# Patient Record
Sex: FEMALE | Race: WHITE | NOT HISPANIC OR LATINO | URBAN - NONMETROPOLITAN AREA
[De-identification: names, ages, dates, MRNs, and addresses within clinical notes are randomized per-mention and may not be internally consistent; named-entity substitution may affect disease eponyms.]

---

## 2019-01-21 NOTE — PATIENT DISCUSSION
Pt father Franklin Ordonez was calling for pt test results. Informed him of  message below and he verbalized understanding. Father then wanted to know if  can order a Celiac test. Father stated that it runs on mother side of the family.  He noticed t Offered Retina consult secondary to suspicious OCT OS.

## 2019-02-04 ENCOUNTER — IMPORTED ENCOUNTER (OUTPATIENT)
Dept: URBAN - NONMETROPOLITAN AREA CLINIC 1 | Facility: CLINIC | Age: 69
End: 2019-02-04

## 2019-02-04 PROBLEM — H25.13: Noted: 2019-02-04

## 2019-02-04 PROBLEM — H52.13: Noted: 2019-02-04

## 2019-02-04 PROCEDURE — 92014 COMPRE OPH EXAM EST PT 1/>: CPT

## 2019-02-04 PROCEDURE — 92015 DETERMINE REFRACTIVE STATE: CPT

## 2019-02-04 NOTE — PATIENT DISCUSSION
Myopia OUDiscussed refractive status in detail with patient. New glasses Rx given today. Continue to monitor. Kaylan OUDiscussed diagnosis with patient. Reviewed symptoms related to cataract progression. Discussed various treatment options with patient. Recommend cataract evaluation by Dr. Maggie Poe. Patient defers treatment at this time. Continue to monitor.

## 2020-02-05 ENCOUNTER — IMPORTED ENCOUNTER (OUTPATIENT)
Dept: URBAN - NONMETROPOLITAN AREA CLINIC 1 | Facility: CLINIC | Age: 70
End: 2020-02-05

## 2020-02-05 PROBLEM — H25.13: Noted: 2020-02-05

## 2020-02-05 PROCEDURE — 92014 COMPRE OPH EXAM EST PT 1/>: CPT

## 2020-02-05 NOTE — PATIENT DISCUSSION
Cataracts-Visually significant cataract OU .-Cataract(s) causing symptomatic impairment of visual function not correctable with a tolerable change in glasses or contact lenses lighting or non-operative means resulting in specific activity limitations and/or participation restrictions including but not limited to reading viewing television driving or meeting vocational or recreational needs. -Expectation is clearer vision and functional improvement in symptoms as well as reduced glare disability after cataract removal.-Order IOLMaster and OPD today. -Recommend LRI OD and Toric OS/Trad OU based on today's OPD testing and lifestyle questionnaire.-All questions were answered regarding surgery including pre and post-op medications appointments activity restrictions and anesthetic usage.-The risks benefits and alternatives and special risk factors for the patient were discussed in detail including but not limited to: bleeding infection retinal detachment vitreous loss problems with the implant and possible need for additional surgery.-Although rare the possibility of complete vision loss was discussed.-The possible need for glasses post-operatively was discussed.-Order medical clearance exam based on history of HBP-Patient elects to proceed with cataract surgery OD . Will schedule at patient's convenience and re-evaluate OS  in the future. Discussed all lens options in detail with patient. Discussed LensX vs Trad with patient. Discussed astigmatism in detail with paient. Astigmatism OS > OD. Discussed LRI for OD in detail and Toric OS due the amount of astigmatism. Discussedkeeping her vision like is now (nearsighted-she sees better to read without glasses now and likes this)  but informed patient this would reduce her distance vision and she would need glasses for her distance. Informed patient this will not correct her astigmatism. Discussed Panoptix do not recc this lens for patient d/t her nearsightedness that she is accustomed to now. Trad sx OU will work with either IOL patient chooses. After discussion we janel leave patient near sighted and no correction for astigmatism. Standard IOL OU/Trad *Drusen noted on dilated exam today.  Verbally ordered OCT MAC*

## 2020-02-25 ENCOUNTER — IMPORTED ENCOUNTER (OUTPATIENT)
Dept: URBAN - NONMETROPOLITAN AREA CLINIC 1 | Facility: CLINIC | Age: 70
End: 2020-02-25

## 2020-02-25 PROBLEM — Z01.818: Noted: 2020-02-25

## 2020-02-25 PROBLEM — F41.1: Noted: 2020-02-25

## 2020-02-25 PROBLEM — I10: Noted: 2020-02-25

## 2020-03-11 ENCOUNTER — IMPORTED ENCOUNTER (OUTPATIENT)
Dept: URBAN - NONMETROPOLITAN AREA CLINIC 1 | Facility: CLINIC | Age: 70
End: 2020-03-11

## 2020-03-11 PROBLEM — H26.491: Noted: 2020-03-11

## 2020-03-11 PROBLEM — H25.12: Noted: 2020-03-11

## 2020-03-11 PROBLEM — Z98.41: Noted: 2020-03-11

## 2020-03-11 PROCEDURE — 99024 POSTOP FOLLOW-UP VISIT: CPT

## 2020-03-11 NOTE — PATIENT DISCUSSION
1 Day POV CE OD 3/10/20 Standard- Discussed diagnosis in detail with patient- Patient is stable and doing well- Wound intact- PCO OD noted but stable and no treatment needed at this time - Continue post op drop as directed- Continue to monitor- RTC 1 week POV A/S with Dr. Sonja Napoles OS-Visually significant.-Cataract  causing symptomatic impairment of visual function not correctable with a tolerable change in glasses or contact lenses lighting or non-operative means resulting in specific activity limitations and/or participation restrictions including but not limited to reading viewing television driving or meeting vocational or recreational needs. -Expectation is clearer vision and reduced glare disability after cataract removal.-Refer to Dr James Weiss for cataract evaluation

## 2020-03-17 ENCOUNTER — IMPORTED ENCOUNTER (OUTPATIENT)
Dept: URBAN - NONMETROPOLITAN AREA CLINIC 1 | Facility: CLINIC | Age: 70
End: 2020-03-17

## 2020-03-17 PROBLEM — Z98.41: Noted: 2020-03-17

## 2020-03-17 PROBLEM — H26.491: Noted: 2020-03-11

## 2020-03-17 PROBLEM — H25.12: Noted: 2020-03-17

## 2020-03-17 PROCEDURE — 99024 POSTOP FOLLOW-UP VISIT: CPT

## 2020-03-17 NOTE — PATIENT DISCUSSION
Cataract-Visually significant cataract OS . -Cataractcausing symptomatic impairment of visual function not correctable with a tolerable change in glasses or contact lenses lighting or non-operative means resulting in specific activity limitations and/or participation restrictions including but not limited to reading viewing television driving or meeting vocational or recreational needs. -Expectation is clearer vision and functional improvement in symptoms as well as reduced glare disability after cataract removal.-Recommend STandard  based on previous OPD testing and lifestyle questionnaire.-All questions were answered regarding surgery including pre and post-op medications appointments activity restrictions and anesthetic usage.-The risks benefits and alternatives and special risk factors for the patient were discussed in detail including but not limited to: bleeding infection retinal detachment vitreous loss problems with the implant and possible need for additional surgery.-Although rare the possibility of complete vision loss was discussed.-The need for glasses post-operatively was discussed.-Patient elects to proceed with cataract surgery OS . Will schedule at patient's convenience. s/p PCIOL CE OD -Pt doing well at 1 week s/p PCIOL. -Continue post-op gtts according to instruction sheet.-Okay to resume usual activites and d/c eye shield. -Patient c/o vision today discussed with aptient she wanted to stay near sighted and be able to read without her glasses and that is how I corrected her VA and she understands-MR done today and patient can get to 20/22 and discussed this with patient in detail. She will get new glasses after CE OS and will see good at distance and still be able to read without glasses like she requested.

## 2020-07-28 ENCOUNTER — IMPORTED ENCOUNTER (OUTPATIENT)
Dept: URBAN - NONMETROPOLITAN AREA CLINIC 1 | Facility: CLINIC | Age: 70
End: 2020-07-28

## 2020-07-28 PROBLEM — Z98.41: Noted: 2020-07-28

## 2020-07-28 PROBLEM — H40.013: Noted: 2020-07-28

## 2020-07-28 PROBLEM — H25.12: Noted: 2020-07-28

## 2020-07-28 PROBLEM — H26.491: Noted: 2020-07-28

## 2020-07-28 NOTE — PATIENT DISCUSSION
Refraction Only- Discussed diagnois in detail with patient - MR done today by Dr. Pierre Novoa and new glasses RX given - Will talk to Dr. Fernando Canela about cataract eval if we should proceed with re-eval - Continue to monitor Borderline Glaucoma OU- Discussed diagnosis in detail with patient- IOP today OD 16 and OS 17- Cup to Disc noted at OD . 6 and OS . 54- OCT ONH done today ordered by Dr. Joe Josephose shows Inferior NFL thinning and OS shows No NFL thinning - Continue to monitor- RTC this week F/U with VF Pseudophakia OU with PCO OD- Discussed diagnosis in detail with patient- PCO noted today but stable and no treatment needed at this time - Continue to monitor------------------------------previous notes-----------------------------Cataract-Visually significant cataract OS . -Cataractcausing symptomatic impairment of visual function not correctable with a tolerable change in glasses or contact lenses lighting or non-operative means resulting in specific activity limitations and/or participation restrictions including but not limited to reading viewing television driving or meeting vocational or recreational needs. -Expectation is clearer vision and functional improvement in symptoms as well as reduced glare disability after cataract removal.-Recommend STandard  based on previous OPD testing and lifestyle questionnaire.-All questions were answered regarding surgery including pre and post-op medications appointments activity restrictions and anesthetic usage.-The risks benefits and alternatives and special risk factors for the patient were discussed in detail including but not limited to: bleeding infection retinal detachment vitreous loss problems with the implant and possible need for additional surgery.-Although rare the possibility of complete vision loss was discussed.-The need for glasses post-operatively was discussed.-Patient elects to proceed with cataract surgery OS . Will schedule at patient's convenience. s/p PCIOL CE OD -Pt doing well at 1 week s/p PCIOL. -Continue post-op gtts according to instruction sheet.-Okay to resume usual activites and d/c eye shield. -Patient c/o vision today discussed with aptient she wanted to stay near sighted and be able to read without her glasses and that is how I corrected her VA and she understands-MR done today and patient can get to 20/22 and discussed this with patient in detail.  She will get new glasses after CE OS and will see good at distance and still be able to read without glasses like she requested.; 's Notes: PARTH Soto 74 7/28/20

## 2020-07-30 ENCOUNTER — IMPORTED ENCOUNTER (OUTPATIENT)
Dept: URBAN - NONMETROPOLITAN AREA CLINIC 1 | Facility: CLINIC | Age: 70
End: 2020-07-30

## 2020-07-30 PROBLEM — Z98.41: Noted: 2020-07-28

## 2020-07-30 PROBLEM — H26.491: Noted: 2020-07-28

## 2020-07-30 PROBLEM — H25.12: Noted: 2020-07-28

## 2020-07-30 PROBLEM — H40.1232: Noted: 2020-07-30

## 2020-07-30 PROCEDURE — 92083 EXTENDED VISUAL FIELD XM: CPT

## 2020-07-30 PROCEDURE — 92020 GONIOSCOPY: CPT

## 2020-07-30 PROCEDURE — 76514 ECHO EXAM OF EYE THICKNESS: CPT

## 2020-07-30 PROCEDURE — 99213 OFFICE O/P EST LOW 20 MIN: CPT

## 2020-07-30 NOTE — PATIENT DISCUSSION
LTG  (Moderate) OU - Discussed diagnosis in detail with patient- No family history of Glaucoma patient does have mild sleep apnea- IOP today 16 OU- Cup to Disc noted at OD . 6 and OS . 54- OCT done previously ordered by Dr. Amrit Brown shows Inferior NFL thinning and OS shows No NFL thinning - PACHY done today  and - VF done today OD shows [place] with Superior Nasal Step and OS shows [address] field with Borderline Enlarged spot - Start [facility] OU sample given today - Continue to monitor- RTC 2 weeks pressure check and cataract re-eval with Dr. Ameena Ritchie in detail with patient l - Continue to monitor Pseudophakia OU with PCO OD- Discussed diagnosis in detail with patient- PCO noted today but stable and no treatment needed at this time - Continue to monitor------------------------------previous notes-----------------------------Cataract-Visually significant cataract OS . -Cataractcausing symptomatic impairment of visual function not correctable with a tolerable change in glasses or contact lenses lighting or non-operative means resulting in specific activity limitations and/or participation restrictions including but not limited to reading viewing television driving or meeting vocational or recreational needs. -Expectation is clearer vision and functional improvement in symptoms as well as reduced glare disability after cataract removal.-Recommend STandard  based on previous OPD testing and lifestyle questionnaire.-All questions were answered regarding surgery including pre and post-op medications appointments activity restrictions and anesthetic usage.-The risks benefits and alternatives and special risk factors for the patient were discussed in detail including but not limited to: bleeding infection retinal detachment vitreous loss problems with the implant and possible need for additional surgery.-Although rare the possibility of complete vision loss was discussed.-The need for glasses post-operatively was discussed.-Patient elects to proceed with cataract surgery OS . Will schedule at patient's convenience. s/p PCIOL CE OD -Pt doing well at 1 week s/p PCIOL. -Continue post-op gtts according to instruction sheet.-Okay to resume usual activites and d/c eye shield. -Patient c/o vision today discussed with aptient she wanted to stay near sighted and be able to read without her glasses and that is how I corrected her VA and she understands-MR done today and patient can get to 20/22 and discussed this with patient in detail.  She will get new glasses after CE OS and will see good at distance and still be able to read without glasses like she requested.; 's Notes: OCT [facility] [place] 7/28/20

## 2020-08-06 ENCOUNTER — IMPORTED ENCOUNTER (OUTPATIENT)
Dept: URBAN - NONMETROPOLITAN AREA CLINIC 1 | Facility: CLINIC | Age: 70
End: 2020-08-06

## 2020-08-06 PROCEDURE — 99213 OFFICE O/P EST LOW 20 MIN: CPT

## 2020-08-06 NOTE — PATIENT DISCUSSION
LTG  (Moderate) OU - Discussed diagnosis in detail with patient- No family history of Glaucoma patient does have mild sleep apnea- IOP today 12 OU- Cup to Disc noted at OD . 6 and OS . 54- OCT done previously ordered by Dr. Lana Milligan shows Inferior NFL thinning and OS shows No NFL thinning - PACHY done previously  and - VF done previously OD shows [place] with Superior Nasal Step and OS shows [address] field with Borderline Enlarged spot - D/C Lumigan QHS OU patient having redness and irritation - Start Betimolol QAM OU sample given today- Continue to monitor- RTC 2 weeks pressure check and cataract re-eval with Dr. Carl Lozada in detail with patient - Continue to monitor Pseudophakia OU with PCO OD- Discussed diagnosis in detail with patient- PCO noted today but stable and no treatment needed at this time - Continue to monitor------------------------------previous notes-----------------------------Cataract-Visually significant cataract OS . -Cataractcausing symptomatic impairment of visual function not correctable with a tolerable change in glasses or contact lenses lighting or non-operative means resulting in specific activity limitations and/or participation restrictions including but not limited to reading viewing television driving or meeting vocational or recreational needs. -Expectation is clearer vision and functional improvement in symptoms as well as reduced glare disability after cataract removal.-Recommend STandard  based on previous OPD testing and lifestyle questionnaire.-All questions were answered regarding surgery including pre and post-op medications appointments activity restrictions and anesthetic usage.-The risks benefits and alternatives and special risk factors for the patient were discussed in detail including but not limited to: bleeding infection retinal detachment vitreous loss problems with the implant and possible need for additional surgery.-Although rare the possibility of complete vision loss was discussed.-The need for glasses post-operatively was discussed.-Patient elects to proceed with cataract surgery OS . Will schedule at patient's convenience. s/p PCIOL CE OD -Pt doing well at 1 week s/p PCIOL. -Continue post-op gtts according to instruction sheet.-Okay to resume usual activites and d/c eye shield. -Patient c/o vision today discussed with misael she wanted to stay near sighted and be able to read without her glasses and that is how I corrected her VA and she understands-MR done today and patient can get to 20/22 and discussed this with patient in detail.  She will get new glasses after CE OS and will see good at distance and still be able to read without glasses like she requested.; Dr's Notes: PARTH Soto 74 7/28/20

## 2020-08-14 ENCOUNTER — IMPORTED ENCOUNTER (OUTPATIENT)
Dept: URBAN - NONMETROPOLITAN AREA CLINIC 1 | Facility: CLINIC | Age: 70
End: 2020-08-14

## 2020-08-14 PROCEDURE — 99213 OFFICE O/P EST LOW 20 MIN: CPT

## 2020-08-14 NOTE — PATIENT DISCUSSION
LTG  (Moderate) OU - Discussed diagnosis in detail with patient- No family history of Glaucoma patient does have mild sleep apnea- IOP today OD 13 and OS 14- Cup to Disc noted at OD . 6 and OS . 54- OCT done previously ordered by Dr. Kesha Rodriguez shows Inferior NFL thinning and OS shows No NFL thinning - PACHY done previously  and - VF done previously OD shows [place] with Superior Nasal Step and OS shows [address] field with Borderline Enlarged spot - D/C Lumigan QHS OU patient having redness and irritation - Continue Azopt BID OU- Sample of Lumify given today for redness- Continue to monitor- RTC A/S with Dr. Ld Su in detail with patient - Continue to monitor Pseudophakia OU with PCO OD- Discussed diagnosis in detail with patient- PCO noted today but stable and no treatment needed at this time - Continue to monitor------------------------------previous notes-----------------------------Cataract-Visually significant cataract OS . -Cataractcausing symptomatic impairment of visual function not correctable with a tolerable change in glasses or contact lenses lighting or non-operative means resulting in specific activity limitations and/or participation restrictions including but not limited to reading viewing television driving or meeting vocational or recreational needs. -Expectation is clearer vision and functional improvement in symptoms as well as reduced glare disability after cataract removal.-Recommend STandard  based on previous OPD testing and lifestyle questionnaire.-All questions were answered regarding surgery including pre and post-op medications appointments activity restrictions and anesthetic usage.-The risks benefits and alternatives and special risk factors for the patient were discussed in detail including but not limited to: bleeding infection retinal detachment vitreous loss problems with the implant and possible need for additional surgery.-Although rare the possibility of complete vision loss was discussed.-The need for glasses post-operatively was discussed.-Patient elects to proceed with cataract surgery OS . Will schedule at patient's convenience. s/p PCIOL CE OD -Pt doing well at 1 week s/p PCIOL. -Continue post-op gtts according to instruction sheet.-Okay to resume usual activites and d/c eye shield. -Patient c/o vision today discussed with aptient she wanted to stay near sighted and be able to read without her glasses and that is how I corrected her VA and she understands-MR done today and patient can get to 20/22 and discussed this with patient in detail.  She will get new glasses after CE OS and will see good at distance and still be able to read without glasses like she requested.; 's Notes: PARTH Soto 74 7/28/20

## 2020-09-16 ENCOUNTER — IMPORTED ENCOUNTER (OUTPATIENT)
Dept: URBAN - NONMETROPOLITAN AREA CLINIC 1 | Facility: CLINIC | Age: 70
End: 2020-09-16

## 2020-09-16 PROCEDURE — 99214 OFFICE O/P EST MOD 30 MIN: CPT

## 2020-09-16 NOTE — PATIENT DISCUSSION
Low Tension Glaucoma - Discussed diagnosis in detail w/  patient - Discussed signs and symptoms associated - IOP 13 OU today - Cup to disc: 0.6 OD and 0.55 OS - Stop Azopt - Start Latanoprost QHS OU Rx sent to pharmacy [facility] -2.59 OS Cataract(s)-Visually significant cataract OS . -Cataract(s) causing symptomatic impairment of visual function not correctable with a tolerable change in glasses or contact lenses lighting or non-operative means resulting in specific activity limitations and/or participation restrictions including but not limited to reading viewing television driving or meeting vocational or recreational needs. -Expectation is clearer vision and functional improvement in symptoms as well as reduced glare disability after cataract removal.-Order IOLMaster and OPD today. -Recommend standard/trad based on today's OPD testing and lifestyle questionnaire.-All questions were answered regarding surgery including pre and post-op medications appointments activity restrictions and anesthetic usage.-The risks benefits and alternatives and special risk factors for the patient were discussed in detail including but not limited to: bleeding infection retinal detachment vitreous loss problems with the implant and possible need for additional surgery.-Although rare the possibility of complete vision loss was discussed.-The possible need for glasses post-operatively was discussed.-Order medical clearance exam based on history of age-Patient elects to proceed with cataract surgery OS .  Will schedule at patient's convenience s/p Pseudophakia OD w/ PCO - intraocular lens is stable and in place - no treatment indication for PCO at this time; discussed signs and symptomss associated w/ progression - continue to monitor for changes; 's Notes: OCT Dean Soto 74 7/28/20

## 2020-09-21 ENCOUNTER — IMPORTED ENCOUNTER (OUTPATIENT)
Dept: URBAN - NONMETROPOLITAN AREA CLINIC 1 | Facility: CLINIC | Age: 70
End: 2020-09-21

## 2020-09-21 PROBLEM — I10: Noted: 2020-09-21

## 2020-09-21 PROBLEM — F41.1: Noted: 2020-09-21

## 2020-09-21 PROBLEM — Z01.818: Noted: 2020-09-21

## 2020-09-21 NOTE — PATIENT DISCUSSION
Medical Clearance-Medical clearance done today. -No outstanding concerns that would preclude surgery.-Patient is cleared to proceed with scheduled surgery.; 's Notes: PARTH Soto 74 7/28/20

## 2020-09-30 ENCOUNTER — IMPORTED ENCOUNTER (OUTPATIENT)
Dept: URBAN - NONMETROPOLITAN AREA CLINIC 1 | Facility: CLINIC | Age: 70
End: 2020-09-30

## 2020-09-30 PROBLEM — H40.013: Noted: 2020-09-30

## 2020-09-30 PROBLEM — Z96.1: Noted: 2020-09-30

## 2020-09-30 PROBLEM — H43.811: Noted: 2020-09-30

## 2020-09-30 PROBLEM — H26.491: Noted: 2020-09-30

## 2020-09-30 PROCEDURE — 99213 OFFICE O/P EST LOW 20 MIN: CPT

## 2020-09-30 PROCEDURE — 92250 FUNDUS PHOTOGRAPHY W/I&R: CPT

## 2020-09-30 NOTE — PATIENT DISCUSSION
PVD OD - Discussed findings of exam in detail with the patient. - The risk of retinal detachment in patients with PVDs was discussed with the patient and the warning signs of retinal detachment were carefully reviewed with the patient. - The patient was warned to return to the office or contact the ophthalmologist on call immediately if they experience signs of retinal detachment. - Optos done today OD shows floaters and OS stable but no signs of retinal holes tears or detachments. - Continue to monitor Pseudophakia OU with PCO OD- Discussed diagnosis in detail with patient- PCO noted today but stable and no treatment needed at this time - Explaiend to patient that their is scar tissue noted today in the right eye but she is not ready for treatment at this time - Continue to monitorLTG  (Moderate) OU - Discussed diagnosis in detail with patient- No family history of Glaucoma patient does have mild sleep apnea- IOP today 16 OU pressures are increased from last visit - Cup to Disc noted at OD . 6 and OS . 54- OCT done previously ordered by Dr. Dustin Britton shows Inferior NFL thinning and OS shows No NFL thinning - PACHY done previously  and - VF done previously OD shows [place] with Superior Nasal Step and OS shows [place] with Borderline Enlarged spot - D/C Lumigan QHS OU patient having redness and irritation - D/C Azopt BID OU- D/C Latanoprost that was RX'ed by Dr. Anuradha Salinas not bringing pressures down - Re start Dorzolamide BID OU Rx sent to pharmacy today - Sample of [address] given previously for redness- Continue to monitor; Dr's Notes: OCT Dean Soto 74 7/28/20

## 2020-10-07 ENCOUNTER — IMPORTED ENCOUNTER (OUTPATIENT)
Dept: URBAN - NONMETROPOLITAN AREA CLINIC 1 | Facility: CLINIC | Age: 70
End: 2020-10-07

## 2020-10-07 PROBLEM — H40.013: Noted: 2020-10-07

## 2020-10-07 PROBLEM — Z98.42: Noted: 2020-10-07

## 2020-10-07 PROBLEM — H43.811: Noted: 2020-10-07

## 2020-10-07 PROBLEM — H26.493: Noted: 2020-10-07

## 2020-10-07 PROBLEM — Z96.1: Noted: 2020-10-07

## 2020-10-07 PROCEDURE — 99024 POSTOP FOLLOW-UP VISIT: CPT

## 2020-10-07 NOTE — PATIENT DISCUSSION
1 day POV CE OS 10/06/20 w/ Standard IOL - Discussed diagnosis in detail with patient- Patient is stable and doing well- Wound intact- Continue all post op drops as directed- PCO OU noted but stable and no treatment needed at this time - Continue to monitor- RTC 1 week POV --------------------------------previous notes-------------------------PVD OD - Discussed findings of exam in detail with the patient. - The risk of retinal detachment in patients with PVDs was discussed with the patient and the warning signs of retinal detachment were carefully reviewed with the patient. - The patient was warned to return to the office or contact the ophthalmologist on call immediately if they experience signs of retinal detachment. - Optos done today OD shows floaters and OS stable but no signs of retinal holes tears or detachments. - Continue to monitor Pseudophakia OU with PCO OD- Discussed diagnosis in detail with patient- PCO noted today but stable and no treatment needed at this time - Explaiend to patient that their is scar tissue noted today in the right eye but she is not ready for treatment at this time - Continue to monitorLTG  (Moderate) OU - Discussed diagnosis in detail with patient- No family history of Glaucoma patient does have mild sleep apnea- IOP today 16 OU pressures are increased from last visit - Cup to Disc noted at OD . 6 and OS . 54- OCT done previously ordered by Dr. Marcus Pel shows Inferior NFL thinning and OS shows No NFL thinning - PACHY done previously  and - VF done previously OD shows [place] with Superior Nasal Step and OS shows [place] with Borderline Enlarged spot - D/C Lumigan QHS OU patient having redness and irritation - D/C Azopt BID OU- D/C Latanoprost that was RX'ed by Dr. Cale Hughes not bringing pressures down - Re start Dorzolamide BID OU Rx sent to pharmacy today - Sample of Humza Pérez given previously for redness- Continue to monitor; Dr's Notes: OCT [facility] 7/28/20

## 2020-10-13 ENCOUNTER — IMPORTED ENCOUNTER (OUTPATIENT)
Dept: URBAN - NONMETROPOLITAN AREA CLINIC 1 | Facility: CLINIC | Age: 70
End: 2020-10-13

## 2020-10-13 PROCEDURE — 99024 POSTOP FOLLOW-UP VISIT: CPT

## 2020-10-13 NOTE — PATIENT DISCUSSION
1 week POV CE OS 10/06/20 w/ Standard IOL - Discussed diagnosis in detail with patient- Patient is stable and doing well- Wound intact- Continue all post op drops as directed- PCO OU noted but stable and no treatment needed at this time - Continue to monitor- RTC 3 weeks POV MR --------------------------------previous notes-------------------------PVD OD - Discussed findings of exam in detail with the patient. - The risk of retinal detachment in patients with PVDs was discussed with the patient and the warning signs of retinal detachment were carefully reviewed with the patient. - The patient was warned to return to the office or contact the ophthalmologist on call immediately if they experience signs of retinal detachment. - Optos done today OD shows floaters and OS stable but no signs of retinal holes tears or detachments. - Continue to monitor Pseudophakia OU with PCO OD- Discussed diagnosis in detail with patient- PCO noted today but stable and no treatment needed at this time - Explaiend to patient that their is scar tissue noted today in the right eye but she is not ready for treatment at this time - Continue to monitorLTG  (Moderate) OU - Discussed diagnosis in detail with patient- No family history of Glaucoma patient does have mild sleep apnea- IOP today 16 OU pressures are increased from last visit - Cup to Disc noted at OD . 6 and OS . 54- OCT done previously ordered by Dr. Maryana Lindsey shows Inferior NFL thinning and OS shows No NFL thinning - PACHY done previously  and - VF done previously OD shows [place] Field with Superior Nasal Step and OS shows Fair field with Borderline Enlarged spot - D/C Lumigan QHS OU patient having redness and irritation - D/C Azopt BID OU- D/C Latanoprost that was RX'ed by Dr. Harris Reasons not bringing pressures down - Re start Dorzolamide BID OU Rx sent to pharmacy today - Sample of Annia Favre given previously for redness- Continue to monitor; Dr's Notes: OCT Dean Charles 74 7/28/20

## 2020-11-05 ENCOUNTER — IMPORTED ENCOUNTER (OUTPATIENT)
Dept: URBAN - NONMETROPOLITAN AREA CLINIC 1 | Facility: CLINIC | Age: 70
End: 2020-11-05

## 2020-11-05 PROCEDURE — 92015 DETERMINE REFRACTIVE STATE: CPT

## 2020-11-05 PROCEDURE — 99024 POSTOP FOLLOW-UP VISIT: CPT

## 2020-11-05 NOTE — PATIENT DISCUSSION
1 month POV CE OS 10/06/20 w/ Standard IOL - Discussed diagnosis in detail with patient- MR done today and new glasses RX given - Patient would like to see Ana Paula Llanes about refit into CLS patient is a previous wear. Consider Monovision - PCO OU noted but stable and no treatment needed at this time - Continue to monitor- RTC 3 months F/U Pseudophakia OU PVD OD - Discussed findings of exam in detail with the patient. - The risk of retinal detachment in patients with PVDs was discussed with the patient and the warning signs of retinal detachment were carefully reviewed with the patient. - The patient was warned to return to the office or contact the ophthalmologist on call immediately if they experience signs of retinal detachment. - Optos done today OD shows floaters and OS stable but no signs of retinal holes tears or detachments. - Continue to monitorLTG  (Moderate) OU - Discussed diagnosis in detail with patient- No family history of Glaucoma patient does have mild sleep apnea- IOP OD 15 and OS 14 - Cup to Disc noted at OD . 6 and OS . 54- OCT done previously ordered by Dr. David Bolton shows Inferior NFL thinning and OS shows No NFL thinning - PACHY done previously  and - VF done previously OD shows [place] Field with Superior Nasal Step and OS shows Fair field with Borderline Enlarged spot - D/C Lumigan QHS OU patient having redness and irritation - D/C Azopt BID OU- D/C Latanoprost that was RX'ed by Dr. Ellen Horn not bringing pressures down - D/C Dorzolamide BID OU  - Sample of Lumify given previously for redness- Patient states today that the pharmacy will not refill her Dorzolamide because she is to early. She states that she does have some Azopt left over. Explained to patient that she may finish her Dorzolamide and then use the Azopt until she is able to have the Dorzolamide filled.   - Continue to monitor; Dr's Notes: OCT Dean Charles 74 7/28/20

## 2020-11-20 NOTE — PROCEDURE NOTE: CLINICAL
PROCEDURE NOTE: Lucentis 0.5 mg OS. Diagnosis: Branch Retinal Vein Occlusion with Macular Edema. Anesthesia: Topical. Prep: Betadine Drops and Scrubs. Prior to injection, risks/benefits/alternatives discussed including infection, loss of vision, hemorrhage, cataract, glaucoma, retinal tears or detachment and patient wished to proceed. Informed consent obtained. . Patient was advised the purpose of the treatment was to slow the progression of the disease, and may not improve visual acuity. Betadine prep was performed. Injection site: 3-4 mm from the limbus. Mask worn during procedure. A lid speculum was used. Intravitreal injection of Lucentis 0.5mg/0.05 ml was given. Discarded remaining *. CRA perfusion confirmed. The eye was irrigated with sterile eye rinse solution. The betadine was washed away. Count fingers vision was verified. The patient tolerated the procedure well and there were no complications from the procedure. Post procedure instructions given. Patient given office phone number/answering service number and advised to call immediately should there be an increase in floaters or redness, loss of vision or pain, or should they have any other questions or concerns. Patient was given the standard instruction sheet. Ruth Lambert

## 2020-11-20 NOTE — PATIENT DISCUSSION
Lucentis 0.5mg recommended today after discussion of benefits, risks and alternatives. The injection was given and tolerated well by patient. Post-injection instructions were reviewed and understood by the patient.

## 2020-11-20 NOTE — PATIENT DISCUSSION
Based on today's exam and after a review of the records, the determination was made for treatment today.

## 2020-12-21 NOTE — PROCEDURE NOTE: CLINICAL
PROCEDURE NOTE: Lucentis 0.5 mg OS. Diagnosis: Branch Retinal Vein Occlusion with Macular Edema. Anesthesia: Topical. Prep: Betadine Drops and Scrubs. Prior to injection, risks/benefits/alternatives discussed including infection, loss of vision, hemorrhage, cataract, glaucoma, retinal tears or detachment and patient wished to proceed. Informed consent obtained. . Patient was advised the purpose of the treatment was to slow the progression of the disease, and may not improve visual acuity. Betadine prep was performed. Injection site: 3-4 mm from the limbus. Mask worn during procedure. A lid speculum was used. Intravitreal injection of Lucentis 0.5mg/0.05 ml was given. Discarded remaining *. CRA perfusion confirmed. The eye was irrigated with sterile eye rinse solution. The betadine was washed away. Count fingers vision was verified. The patient tolerated the procedure well and there were no complications from the procedure. Post procedure instructions given. Patient given office phone number/answering service number and advised to call immediately should there be an increase in floaters or redness, loss of vision or pain, or should they have any other questions or concerns. Patient was given the standard instruction sheet. Sal Vera

## 2021-01-13 NOTE — PROCEDURE NOTE: CLINICAL
PROCEDURE NOTE: Lucentis 0.5mg Sample OS. Diagnosis: Branch Retinal Vein Occlusion with Macular Edema. Anesthesia: Topical. Prep: Betadine Drops and Betadine Scrub. Prior to injection, risks/benefits/alternatives discussed including infection, loss of vision, hemorrhage, cataract, glaucoma, retinal tears or detachment and patient wished to proceed. Informed consent obtained. Patient was advised the purpose of the treatment was to slow the progression of the disease, and may not improve visual acuity. Betadine prep was performed. Injection site: 3-4 mm from the limbus. A surgical mask was worn. A lid speculum was used. Intravitreal injection of Lucentis 0.5mg/0.05 ml was given. Discarded remaining *. CRA perfusion confirmed. The eye was irrigated with sterile irrigating solution. The betadine was washed away. Count fingers vision was verified. The patient tolerated the procedure well and there were no complications from the procedure. Post procedure instructions were given. Patient given office phone number/answering service number and advised to call immediately should there be an increase in floaters or redness, loss of vision or pain, or should they have any other questions or concerns. Denilson Fang

## 2021-01-13 NOTE — PATIENT DISCUSSION
Lucentis 0.5mg Sample recommended today after discussion of benefits, risks and alternatives. The injection was given and tolerated well by patient. Post-injection instructions were reviewed and understood by the patient.

## 2021-02-08 ENCOUNTER — IMPORTED ENCOUNTER (OUTPATIENT)
Dept: URBAN - NONMETROPOLITAN AREA CLINIC 1 | Facility: CLINIC | Age: 71
End: 2021-02-08

## 2021-02-08 PROBLEM — H40.1232: Noted: 2021-02-08

## 2021-02-08 PROBLEM — H26.493: Noted: 2020-10-07

## 2021-02-08 PROBLEM — Z96.1: Noted: 2021-02-08

## 2021-02-08 PROBLEM — H43.811: Noted: 2020-10-07

## 2021-02-08 PROCEDURE — 99213 OFFICE O/P EST LOW 20 MIN: CPT

## 2021-02-08 NOTE — PATIENT DISCUSSION
P/C IOL OU- Discussed diagnosis in detail with patient- Both intraocular implants in place and stable- Patient would like to see Latanya Whitehead about refit into CLS patient is a previous wear. Consider Monovision - PCO OU noted but stable and no treatment needed at this time - Continue to monitorPVD OD - Discussed findings of exam in detail with the patient. - The risk of retinal detachment in patients with PVDs was discussed with the patient and the warning signs of retinal detachment were carefully reviewed with the patient. - The patient was warned to return to the office or contact the ophthalmologist on call immediately if they experience signs of retinal detachment. - Optos done previously OD shows floaters and OS stable but no signs of retinal holes tears or detachments. - Continue to monitorLTG  (Moderate) OU - Discussed diagnosis in detail with patient- No family history of Glaucoma patient does have mild sleep apnea- IOP at 16 OU.- Cup to Disc noted at OD . 6 and OS . 54- OCT done previously ordered by Dr. Bert Barragan shows Inferior NFL thinning and OS shows No NFL thinning - PACHY done previously  and - VF done previously OD shows [place] with Superior Nasal Step and OS shows Fair field with Borderline Enlarged spot - D/C Lumigan QHS OU patient having redness and irritation - D/C Azopt BID OU- D/C Latanoprost that was RX'ed by Dr. Parker Orellana not bringing pressures down - Sample of Lumify given previously for redness- Continue Dorzolamide BID OU- Continue to monitor- RTC in 5 months for follow up with OCT- Patient has tried mulitple meds with some effect.  Would like to consult with  Glaucoma specialist at [facility] or Inez Young for other treatment options.; 's Notes: OCT Dean Soto 74 7/28/20

## 2021-02-12 NOTE — PROCEDURE NOTE: CLINICAL
PROCEDURE NOTE: Lucentis 0.5 mg OS. Diagnosis: Branch Retinal Vein Occlusion with Macular Edema. Anesthesia: Topical. Prep: Betadine Drops and Scrubs. Prior to injection, risks/benefits/alternatives discussed including infection, loss of vision, hemorrhage, cataract, glaucoma, retinal tears or detachment and patient wished to proceed. Informed consent obtained. . Patient was advised the purpose of the treatment was to slow the progression of the disease, and may not improve visual acuity. Betadine prep was performed. Injection site: 3-4 mm from the limbus. Mask worn during procedure. A lid speculum was used. Intravitreal injection of Lucentis 0.5mg/0.05 ml was given. Discarded remaining *. CRA perfusion confirmed. The eye was irrigated with sterile eye rinse solution. The betadine was washed away. Count fingers vision was verified. The patient tolerated the procedure well and there were no complications from the procedure. Post procedure instructions given. Patient given office phone number/answering service number and advised to call immediately should there be an increase in floaters or redness, loss of vision or pain, or should they have any other questions or concerns. Patient was given the standard instruction sheet. Denise Sparrow

## 2021-03-10 ENCOUNTER — IMPORTED ENCOUNTER (OUTPATIENT)
Dept: URBAN - NONMETROPOLITAN AREA CLINIC 1 | Facility: CLINIC | Age: 71
End: 2021-03-10

## 2021-03-10 PROBLEM — H16.223: Noted: 2021-03-10

## 2021-03-10 PROBLEM — H40.1232: Noted: 2021-03-10

## 2021-03-10 PROBLEM — H43.811: Noted: 2021-03-10

## 2021-03-10 PROBLEM — Z96.1: Noted: 2021-03-10

## 2021-03-10 PROBLEM — H01.024: Noted: 2021-03-10

## 2021-03-10 PROBLEM — H01.021: Noted: 2021-03-10

## 2021-03-10 PROBLEM — H26.493: Noted: 2021-03-10

## 2021-03-10 PROCEDURE — 99213 OFFICE O/P EST LOW 20 MIN: CPT

## 2021-03-10 NOTE — PATIENT DISCUSSION
DILLON OU- Discussed diagnosis in detail with patient- Discussed signs and symptoms of progression- Recommend patient drinking plenty of water and starting Omega 3’s - Recommend Refresh or Systane  throughout the day- Consider Restasis or plugs in the future if no improvement- Explained that Dorzolamide may be irritating eyes patient has been on different drops with the same effect. - Will D/C Dorzolamide and start Xelpros QHS OU sample given today - Consider I&R if no improvement- Start Bepreve BID OU sample given today- Continue to monitorBlepharitis OU- Discussed diagnosis in detail with patient- Recommend patient using [employer] J baby shampoo to scrub lid daily- Continue to monitor PREVIOUS NOTES LTG  (Moderate) OU - Discussed diagnosis in detail with patient- No family history of Glaucoma patient does have mild sleep apnea- IOP at 16 OU.- Cup to Disc noted at OD . 6 and OS . 54- OCT done previously ordered by Dr. Caridad Novoa shows Inferior NFL thinning and OS shows No NFL thinning - PACHY done previously  and - VF done previously OD shows [place] with Superior Nasal Step and OS shows Fair field with Borderline Enlarged spot - D/C Lumigan QHS OU patient having redness and irritation - D/C Azopt BID OU- D/C Latanoprost that was RX'ed by Dr. Radha Bolton not bringing pressures down - Sample of Lumify given previously for redness- D/C Dorzolamide BID OU may be irritating eyes - Start Xelpros QHS OU sample given today - Continue to monitor- RTC in 5 months for follow up with OCT- Patient has tried mulitple meds with some effect. Would like to consult with  Glaucoma specialist at [facility] or Clovia Schwab ) for other treatment options. - Patient has appointment with Dr. Lanice Lesches coming up P/C IOL OU- Discussed diagnosis in detail with patient- Both intraocular implants in place and stable- Patient would like to see Corey Cuadra about refit into CLS patient is a previous wear. Consider Monovision - PCO OU noted but stable and no treatment needed at this time - Continue to monitorPVD OD - Discussed findings of exam in detail with the patient. - The risk of retinal detachment in patients with PVDs was discussed with the patient and the warning signs of retinal detachment were carefully reviewed with the patient. - The patient was warned to return to the office or contact the ophthalmologist on call immediately if they experience signs of retinal detachment. - Optos done previously OD shows floaters and OS stable but no signs of retinal holes tears or detachments.  - Continue to monitor; 's Notes: OCT Dean Soto 74 7/28/20

## 2021-04-09 NOTE — PROCEDURE NOTE: CLINICAL
PROCEDURE NOTE: Eylea Prefilled Syringe 2mg OS. Diagnosis: Branch Retinal Vein Occlusion with Macular Edema. Prep: Betadine Drops and Betadine Scrub. Prior to injection, risks/benefits/alternatives discussed including corneal abrasion, infection, loss of vision, hemorrhage, cataract, glaucoma, retinal tears or detachment. A written consent is on file, and the need for today's injection was discussed and the patient is understanding and wishes to proceed. A 30G needle was placed on an Eylea 2mg/0.05ml Pre-filled Syringe. Betadine prep was performed. Topical anesthesia was induced with Alcaine. 4% lidocaine pledge. A lid speculum was used. An intravitreal injection of Eylea was given. Injection site: 3-4 mm from the limbus. The used syringe/needle was transferred to a biohazard container. Lid speculum removed. Mask worn during procedure. Patient tolerated procedure well. Count fingers vision was verified. There were no complications. Patient was given the standard instruction sheet. [facility]

## 2021-04-09 NOTE — PATIENT DISCUSSION
Keri recommended today after discussion of benefits, risks and alternatives. The injection was given and tolerated well by patient. Post-injection instructions were reviewed and understood by the patient.

## 2021-06-18 NOTE — PROCEDURE NOTE: CLINICAL
PROCEDURE NOTE: Eylea Prefilled Syringe 2mg OS. Diagnosis: Branch Retinal Vein Occlusion with Macular Edema. Prep: Betadine Drops and Betadine Scrub. Prior to injection, risks/benefits/alternatives discussed including corneal abrasion, infection, loss of vision, hemorrhage, cataract, glaucoma, retinal tears or detachment. A written consent is on file, and the need for today's injection was discussed and the patient is understanding and wishes to proceed. A 30G needle was placed on an Eylea 2mg/0.05ml Pre-filled Syringe. Betadine prep was performed. Topical anesthesia was induced with Alcaine. 4% lidocaine pledge. A lid speculum was used. An intravitreal injection of Eylea was given. Injection site: 3-4 mm from the limbus. The used syringe/needle was transferred to a biohazard container. Lid speculum removed. Mask worn during procedure. Patient tolerated procedure well. Count fingers vision was verified. There were no complications. Patient was given the standard instruction sheet. Arlyn Alberts

## 2021-06-26 NOTE — PATIENT DISCUSSION
Patient Information    Lab -- Fasting labwork has been ordered for you.    Fasting Labs Diet Restrictions  Please come prior to your next appointment to recheck fasting labs.  · Please come fasting (at least 8 hours) to your next appointment to recheck labs.  · Please drink plenty of water before your appointment.  · You can take any prescribed or routine medicine with water before your appointment.  · You can brush your teeth even if you are fasting.      Follow Up  -- Follow up with your regular Primary Care Provider.     Additional Educational Resources:  For additional resources regarding your symptoms, diagnosis, or further health information, please visit the Health Resources section on [employer] or the Online Health Resources section in [employer].      For the Echo call Cardiology at [phone]    Gastroenterology [phone]     To schedule US please call [phone]      Patient Education     Ahsan alimentación santhosh  Ahsan alimentación santhosh puede reducir muchos de los riesgos a you sean. Puede ayudarle a bajar el nivel de colesterol, la presión arterial y el peso. You dieta no tiene por qué ser insípida o aburrida para ser saludable. Simplemente siga estos clarissa consejos: coma menos grasa, menos sal y más fibra. Toda you lucinda podrá beneficiarse de ahsan dieta saludable.  1. Coma menos grasa  · Elija childers de carne y pescado que tengan menos grasa.  · Evite la mantequilla y el tocino y use menos margarina.  · No coma alimentos que contengan aceites de babb, de shaun o hidrogenados.  · Coma menos productos con alto contenido de grasa, hua queso, helados y leche entera.  · Obtenga un libro de cocina con recetas saludables y pruebe algunas recetas con bajo contenido en grasa.  2. Coma menos sal  · No agregue sal a la comida mientras cocina y quite el salero de la molina.  · No use ingredientes con alto contenido de sal, hua glutamato monosódico (MSG), salsa de soja, bicarbonato sódico o polvo de  Patient understands condition, prognosis and need for follow up care. hornear.  · En vez de sal, sazone la comida con condimentos y esencias hua sonia, ajo o cebolla.  3. Coma más fibra  · Coma fruta y verduras frescas.  · Agregue alexa, arroz integral y salvado a weldon dieta.  · Los frijoles y las bobo son ahsan narendra excelente de fibra.  · Cuando coma más fibra, recuerde beber más agua para prevenir el estreñimiento.  Date Last Reviewed: 5/11/2015  © 0406-2339 [employer]. [address] [place] [address]. Todos los derechos reservados. Esta información no pretende sustituir la atención médica profesional. Sólo weldon médico puede diagnosticar y tratar un problema de sean.           Patient Education     Ejercicios aeróbicos para un corazón antonio  El ejercicio, además de aumentar el nivel de energía y aliviar el estrés, fortalece el músculo del corazón, reduce la presión arterial y el nivel de colesterol, y quema calorías.  Elija un ejercicio aeróbico  Elija ahsan actividad que tyron trabajar al corazón y a los pulmones. El ejercicio aeróbico puede mejorar la manera en que el corazón y otros músculos usan el oxígeno. Para que le resulte más divertido, tyron el ejercicio con un amigo y escoja ahsan actividad que le guste. He aquí algunas ideas:  · Caminar  · Nadar  · Montar en bicicleta  · Subir escaleras  · Bailar  · Trotar     Recuerde que hacer aunque sea algo de actividad es mejor que no hacer nada.   Tyron ejercicio con regularidad  Si no amaral estado haciendo ejercicio con regularidad, pregunte mihaela a weldon médico si puede hacerlo, y si le da weldon autorización empiece poco a poco. Siga estos consejos:  · Comience por hacer ejercicio clarissa veces por semana entre shraddha y ingris minutos cada vez.  · Cuando se sienta cómodo, agregue algunos minutos cada sesión.  · Siga aumentando la frecuencia y duración de las sesiones de ejercicio hasta llegar a clarissa o cuatro veces por semana. Cada sesión debería durar unos 40 minutos en promedio e incluir actividad física de moderada a  intensa.  · Si le hoskins dado nitroglicerina, asegúrese de llevarla cuando hace ejercicio.  · Si tiene un ataque de angina de pecho mientras está haciendo ejercicio, pare el ejercicio, tome la nitroglicerina y llame al médico.  © 9392-6424 [email or web address]. [address] [place] [address], [place] [address]. Todos los derechos reservados. Esta información no pretende sustituir la atención médica profesional. Sólo weldon médico puede diagnosticar y tratar un problema de sean.

## 2021-08-06 NOTE — PROCEDURE NOTE: CLINICAL
PROCEDURE NOTE: Eylea Prefilled Syringe 2mg OS. Diagnosis: Branch Retinal Vein Occlusion with Macular Edema. Prep: Betadine Drops and Betadine Scrub. Prior to injection, risks/benefits/alternatives discussed including corneal abrasion, infection, loss of vision, hemorrhage, cataract, glaucoma, retinal tears or detachment. A written consent is on file, and the need for today's injection was discussed and the patient is understanding and wishes to proceed. A 30G needle was placed on an Eylea 2mg/0.05ml Pre-filled Syringe. Betadine prep was performed. Topical anesthesia was induced with Alcaine. 4% lidocaine pledge. A lid speculum was used. An intravitreal injection of Eylea was given. Injection site: 3-4 mm from the limbus. The used syringe/needle was transferred to a biohazard container. Lid speculum removed. Mask worn during procedure. Patient tolerated procedure well. Count fingers vision was verified. There were no complications. Patient was given the standard instruction sheet. Sal Vera

## 2022-04-09 ASSESSMENT — TONOMETRY
OD_IOP_MMHG: 15
OD_IOP_MMHG: 16
OS_IOP_MMHG: 16
OS_IOP_MMHG: 16
OS_IOP_MMHG: 15
OD_IOP_MMHG: 15
OS_IOP_MMHG: 13
OD_IOP_MMHG: 16
OS_IOP_MMHG: 14
OS_IOP_MMHG: 16
OS_IOP_MMHG: 12
OS_IOP_MMHG: 16
OD_IOP_MMHG: 12
OD_IOP_MMHG: 15
OD_IOP_MMHG: 16
OS_IOP_MMHG: 15
OS_IOP_MMHG: 16
OD_IOP_MMHG: 13
OD_IOP_MMHG: 16
OS_IOP_MMHG: 16
OD_IOP_MMHG: 16
OD_IOP_MMHG: 15
OS_IOP_MMHG: 17
OD_IOP_MMHG: 16
OD_IOP_MMHG: 14
OS_IOP_MMHG: 14
OD_IOP_MMHG: 13
OS_IOP_MMHG: 14
OS_IOP_MMHG: 16
OD_IOP_MMHG: 16

## 2022-04-09 ASSESSMENT — VISUAL ACUITY
OD_SC: 20/63
OS_PH: 20/29
OD_PH: 20/40-
OD_PH: 20/40
OD_GLARE: 20/40
OS_CC: 20/25 W/ GLASSES
OD_SC: 20/30-2
OD_CC: 20/20-1
OS_PH: 20/50+2
OS_SC: 20/30
OS_SC: 20/22-2
OD_GLARE: 20/40
OS_GLARE: 20/40
OS_SC: 20/20-
OS_CC: 20/200+1
OD_PAM: 20/20
OU_CC: 20/20
OS_GLARE: 20/40
OD_CC: 20/20
OS_SC: 20/25
OS_GLARE: 20/40
OS_SC: 20/32+
OD_CC: 20/125
OS_CC: 20/25 W/ GLASSES
OS_AM: 20/20
OD_CC: 20/25-
OS_SC: 20/20
OD_SC: 20/50+2
OD_PH: 20/20
OS_CC: 20/400
OD_PH: 20/40-
OS_CC: 20/20
OS_GLARE: 20/40
OS_GLARE: 20/40
OD_SC: 20/30-2
OS_SC: 20/25+2
OS_AM: 20/20
OS_SC: 20/40
OD_SC: 20/40
OD_GLARE: 20/40
OD_PH: 20/30-2
OS_AM: 20/20
OS_CC: 20/20
OD_PH: 20/30-1
OD_CC: 20/200
OS_SC: 20/20-2
OS_CC: 20/40-2
OD_PH: 20/40
OS_PH: 20/20
OS_GLARE: 20/40
OD_SC: 20/20
OD_CC: 20/60-
OS_PH: 20/20-
OS_CC: 20/200
OD_SC: 20/30
OD_SC: 20/25

## 2022-04-09 ASSESSMENT — PACHYMETRY
OS_CT_UM: 509; ADJ: VTHIN
OD_CT_UM: 502; ADJ: VTHIN
OD_CT_UM: 502; ADJ: VTHIN
OS_CT_UM: 509; ADJ: VTHIN
OD_CT_UM: 502; ADJ: VTHIN
OS_CT_UM: 509; ADJ: VTHIN
OS_CT_UM: 509; ADJ: VTHIN
OD_CT_UM: 502; ADJ: VTHIN
OD_CT_UM: 502; ADJ: VTHIN
OS_CT_UM: 509; ADJ: VTHIN
OD_CT_UM: 502; ADJ: VTHIN
OS_CT_UM: 509; ADJ: VTHIN
OD_CT_UM: 502; ADJ: VTHIN
OD_CT_UM: 502; ADJ: VTHIN
OS_CT_UM: 509; ADJ: VTHIN
OD_CT_UM: 502; ADJ: VTHIN
OD_CT_UM: 502; ADJ: VTHIN
OS_CT_UM: 509; ADJ: VTHIN

## 2022-10-10 ENCOUNTER — COMPREHENSIVE EXAM (OUTPATIENT)
Dept: URBAN - NONMETROPOLITAN AREA CLINIC 1 | Facility: CLINIC | Age: 72
End: 2022-10-10

## 2022-10-10 DIAGNOSIS — H52.4: ICD-10-CM

## 2022-10-10 PROCEDURE — 92014 COMPRE OPH EXAM EST PT 1/>: CPT

## 2022-10-10 PROCEDURE — 92015 DETERMINE REFRACTIVE STATE: CPT

## 2022-10-10 ASSESSMENT — TONOMETRY
OD_IOP_MMHG: 14
OS_IOP_MMHG: 16

## 2022-10-10 ASSESSMENT — KERATOMETRY
OS_K2POWER_DIOPTERS: 47.25
OD_K2POWER_DIOPTERS: 47.25
OS_AXISANGLE2_DEGREES: 74
OD_K1POWER_DIOPTERS: 46.50
OS_K1POWER_DIOPTERS: 46.00
OD_AXISANGLE_DEGREES: 156
OD_AXISANGLE2_DEGREES: 66
OS_AXISANGLE_DEGREES: 164

## 2022-10-10 ASSESSMENT — VISUAL ACUITY
OS_CC: 20/20-2
OD_CC: 20/20-2

## 2022-10-10 NOTE — PATIENT DISCUSSION
Hx of multiple glaucoma drops, but was allergic to several or they didn't keep IOP low enough:  Lumigan , Azopt, Latanoprost, Dorzolamide, and Xelpros.

## 2022-10-10 NOTE — PATIENT DISCUSSION
DILLON OU- Discussed diagnosis in detail with patient. Discussed signs and symptoms of progression. Recommend patient drinking plenty of water and starting Omega 3’s. Recommend Refresh or Systane throughout the day. Consider Restasis or plugs in the future if no improvement.

## 2022-10-10 NOTE — PATIENT DISCUSSION
OCT done previously ordered by Dr. Marques Fisher shows Inferior NFL thinning and OS shows No NFL thinning. PACHY done previously  and .  VF done previously:  OD shows [place] Field with Superior Nasal Step and OS shows [address] field with Borderline Enlarged spot.

## 2022-10-10 NOTE — PATIENT DISCUSSION
(Moderate) OU: Discussed diagnosis in detail with patient. No family history of glaucoma patient does have mild sleep apnea.

## 2022-10-10 NOTE — PATIENT DISCUSSION
Patient is currently being monitored by Dr. Cherylene Lefort at [facility] for her glaucoma. She had SLT about a year ago (2021). Expressed importance of keeping his appts as scheduled.

## 2022-12-14 ENCOUNTER — CONSULTATION/EVALUATION (OUTPATIENT)
Dept: URBAN - NONMETROPOLITAN AREA CLINIC 1 | Facility: CLINIC | Age: 72
End: 2022-12-14

## 2022-12-14 PROCEDURE — 92014 COMPRE OPH EXAM EST PT 1/>: CPT

## 2022-12-14 PROCEDURE — 66821 AFTER CATARACT LASER SURGERY: CPT

## 2022-12-14 ASSESSMENT — TONOMETRY
OD_IOP_MMHG: 16
OS_IOP_MMHG: 16

## 2022-12-14 ASSESSMENT — VISUAL ACUITY
OS_CC: 20/20-1
OD_BAT: 20/40
OD_CC: 20/25-2
OS_BAT: 20/40

## 2022-12-14 NOTE — PATIENT DISCUSSION
Patient is currently being monitored by Dr. Jose Saleh at [facility] for her glaucoma. She had SLT about a year ago (2021). Expressed importance of keeping his appts as scheduled.

## 2022-12-14 NOTE — PATIENT DISCUSSION
OCT done previously ordered by Dr. Kay Cuenca shows Inferior NFL thinning and OS shows No NFL thinning. PACHY done previously  and .  VF done previously:  OD shows [place] with Superior Nasal Step and OS shows [address] field with Borderline Enlarged spot.

## 2022-12-14 NOTE — PROCEDURE NOTE: CLINICAL
PROCEDURE NOTE: YAG Capsulotomy OD. Diagnosis: Other Secondary Cataract. Anesthesia: Topical. The purpose and nature of the procedure, possible alternative methods of treatment, the risks involved and the possibility of complications were discussed with patient. The Patient wishes to proceed and the consent was signed. 1 gtt Prolensa applied. The laser was then performed under topical anesthesia with no complications. Post op instructions were given to patient as well as a follow-up appointment. Patient was advised to call our office if any questions or concerns. Leesa Johnson

## 2023-09-15 NOTE — PATIENT DISCUSSION
Patient understands condition, prognosis and need for follow up care. awaiting radiology Yes - the patient is able to be screened

## 2025-05-13 NOTE — PATIENT DISCUSSION
I will  work it myself    The cataracts are creating some visual symptoms that are tolerable at this time.